# Patient Record
Sex: FEMALE | Race: WHITE | NOT HISPANIC OR LATINO | Employment: STUDENT | URBAN - METROPOLITAN AREA
[De-identification: names, ages, dates, MRNs, and addresses within clinical notes are randomized per-mention and may not be internally consistent; named-entity substitution may affect disease eponyms.]

---

## 2023-02-21 LAB
CALCIDIOL (25 OH VITAMIN D3) (NG/ML) IN SER/PLAS: 39 NG/ML
COBALAMIN (VITAMIN B12) (PG/ML) IN SER/PLAS: 182 PG/ML (ref 211–911)
ERYTHROCYTE DISTRIBUTION WIDTH (RATIO) BY AUTOMATED COUNT: 13.7 % (ref 11.5–14.5)
ERYTHROCYTE MEAN CORPUSCULAR HEMOGLOBIN CONCENTRATION (G/DL) BY AUTOMATED: 31.1 G/DL (ref 32–36)
ERYTHROCYTE MEAN CORPUSCULAR VOLUME (FL) BY AUTOMATED COUNT: 81 FL (ref 80–100)
ERYTHROCYTES (10*6/UL) IN BLOOD BY AUTOMATED COUNT: 5 X10E12/L (ref 4–5.2)
HEMATOCRIT (%) IN BLOOD BY AUTOMATED COUNT: 40.5 % (ref 36–46)
HEMOGLOBIN (G/DL) IN BLOOD: 12.6 G/DL (ref 12–16)
IRON (UG/DL) IN SER/PLAS: 143 UG/DL (ref 35–150)
IRON BINDING CAPACITY (UG/DL) IN SER/PLAS: 351 UG/DL (ref 240–445)
IRON SATURATION (%) IN SER/PLAS: 41 % (ref 25–45)
LEUKOCYTES (10*3/UL) IN BLOOD BY AUTOMATED COUNT: 6.1 X10E9/L (ref 4.4–11.3)
PLATELETS (10*3/UL) IN BLOOD AUTOMATED COUNT: 297 X10E9/L (ref 150–450)
THYROTROPIN (MIU/L) IN SER/PLAS BY DETECTION LIMIT <= 0.05 MIU/L: 1.61 MIU/L (ref 0.44–3.98)

## 2023-02-22 LAB
ESTIMATED AVERAGE GLUCOSE FOR HBA1C: 100 MG/DL
HEMOGLOBIN A1C/HEMOGLOBIN TOTAL IN BLOOD: 5.1 %

## 2023-03-19 PROBLEM — R63.5 WEIGHT GAIN: Status: ACTIVE | Noted: 2023-03-19

## 2023-03-19 PROBLEM — R53.83 FATIGUE: Status: ACTIVE | Noted: 2023-03-19

## 2023-03-19 PROBLEM — K59.00 CONSTIPATION: Status: ACTIVE | Noted: 2023-03-19

## 2023-03-19 PROBLEM — F33.1 MODERATE EPISODE OF RECURRENT MAJOR DEPRESSIVE DISORDER (MULTI): Status: ACTIVE | Noted: 2023-03-19

## 2023-03-19 RX ORDER — ESCITALOPRAM OXALATE 10 MG/1
1.5 TABLET ORAL DAILY
COMMUNITY
Start: 2021-06-29 | End: 2023-10-24 | Stop reason: SDUPTHER

## 2023-03-19 RX ORDER — BUPROPION HYDROCHLORIDE 150 MG/1
1 TABLET ORAL DAILY
COMMUNITY
Start: 2022-09-01 | End: 2023-10-24

## 2023-03-19 RX ORDER — LEVONORGESTREL 19.5 MG/1
INTRAUTERINE DEVICE INTRAUTERINE
COMMUNITY
Start: 2022-06-13

## 2023-03-27 ENCOUNTER — OFFICE VISIT (OUTPATIENT)
Dept: PRIMARY CARE | Facility: CLINIC | Age: 23
End: 2023-03-27
Payer: COMMERCIAL

## 2023-03-27 VITALS
BODY MASS INDEX: 29.89 KG/M2 | WEIGHT: 186 LBS | DIASTOLIC BLOOD PRESSURE: 72 MMHG | SYSTOLIC BLOOD PRESSURE: 116 MMHG | HEART RATE: 70 BPM | HEIGHT: 66 IN

## 2023-03-27 DIAGNOSIS — E53.8 VITAMIN B12 DEFICIENCY: Primary | ICD-10-CM

## 2023-03-27 PROCEDURE — 99213 OFFICE O/P EST LOW 20 MIN: CPT | Performed by: FAMILY MEDICINE

## 2023-03-27 ASSESSMENT — PATIENT HEALTH QUESTIONNAIRE - PHQ9
1. LITTLE INTEREST OR PLEASURE IN DOING THINGS: NOT AT ALL
2. FEELING DOWN, DEPRESSED OR HOPELESS: NOT AT ALL
SUM OF ALL RESPONSES TO PHQ9 QUESTIONS 1 AND 2: 0

## 2023-03-27 NOTE — PATIENT INSTRUCTIONS
Start Vitamin B12 1000 micrograms twice a day.  Recheck the labs in about 3 months, any  lab, no need to fast.

## 2023-03-27 NOTE — PROGRESS NOTES
"Subjective   Patient ID: Vera Guerra is a 23 y.o. female who presents for review labs.  Most of her labs from February looked really good including her vitamin D, thyroid, hemoglobin A1c, iron and CBC.  We then talked about her vitamin B12 level which is quite low.  She eats a regular diet, is not vegan or vegetarian.  She does not take vitamins.    She denies any other complaints.    Objective   /72   Pulse 70   Ht 1.676 m (5' 6\")   Wt 84.4 kg (186 lb)   BMI 30.02 kg/m²     Physical Exam  Alert adult white female, no acute distress  Affect pleasant and appropriate  Labs reviewed as above    Assessment/Plan   Problem List Items Addressed This Visit    None  Visit Diagnoses       Vitamin B12 deficiency    -  Primary    Relevant Orders    Vitamin B12    Folate    Methylmalonic acid, serum        She will start taking vitamin B12 over-the-counter 1000 mcg twice a day we will recheck labs in 3 months.  I did offer her the option of injectable B12 but she declined.       "

## 2023-10-24 ENCOUNTER — TELEMEDICINE (OUTPATIENT)
Dept: BEHAVIORAL HEALTH | Facility: CLINIC | Age: 23
End: 2023-10-24
Payer: COMMERCIAL

## 2023-10-24 DIAGNOSIS — F33.0 MILD EPISODE OF RECURRENT MAJOR DEPRESSIVE DISORDER (CMS-HCC): ICD-10-CM

## 2023-10-24 DIAGNOSIS — F41.1 GAD (GENERALIZED ANXIETY DISORDER): ICD-10-CM

## 2023-10-24 PROCEDURE — 99213 OFFICE O/P EST LOW 20 MIN: CPT | Performed by: NURSE PRACTITIONER

## 2023-10-24 RX ORDER — ESCITALOPRAM OXALATE 10 MG/1
10 TABLET ORAL DAILY
Qty: 90 TABLET | Refills: 3 | Status: SHIPPED | OUTPATIENT
Start: 2023-10-24 | End: 2024-02-23 | Stop reason: SDUPTHER

## 2023-10-24 NOTE — PROGRESS NOTES
"Vera is a 23 year old female with previous psychiatric history of depression. She lives in [place] by herself and attends [employer] with goal to become .      Vera is seen today via telehealth due to COVID for a follow up evaluation and management of symptoms. Last appointment Cymbalta 30mg daily started.  Going full time with job.     Depression/anxiety: Increased anxiety with work. Mood is \"ok.\" Feeling flat. Sleep is good. Appetite is less. Denies current SI/HI or self harm urges. States feels like medication has affected period.      ADHD: Focus and concentration are good.   OCD: Denies symptoms.   Debbie: Denies symptoms.   Psychosis: Denies symptoms.      Medication History: Birth control, Zoloft, Lexapro, Wellbutrin. Cymbalta.      Therapy: [facility]. Haven't gone in while.   Employment: Dispatcher.   Relationships: Two sisters and brother. Has boyfriend.   Family Psychiatric history: None.   Substance use: Denies symptoms.   Legal: Denies symptoms.      Developmental: Met developmental milestones.   All other systems reviewed and no concerns noted.      Mental Status Exam  General Appearance: Well groomed, appropriate eye contact  Attitude/Behavior: Cooperative  Motor: No psychomotor agitation or retardation, no tremor or other abnormal movements  Speech: Normal rate, volume, prosody  Gait/Station: WFL - Within functional limits  Mood: flat, anxious.  Affect: Euthymic, full-range  Thought Process: Linear, goal directed  Thought Associations: No loosening of associations  Thought Content: Normal  Perception: No perceptual abnormalities noted  Sensorium: Alert  Insight: Intact  Judgement: Intact  Cognition: Cognitively intact to conversational testing with respect to attention, orientation, fund of knowledge, recent and remote memory, and language     Review of Systems     Psychiatric: as noted in HPI.       Constitutional: no sleep apnea,~normal sleeping,~no night wakings,~no " snoring,~not a picky eater,~normal appetite~and~no swallowing problems.   ENT: no hearing tested~and~no hearing loss.   Cardiovascular: no murmur~and~no heart defect.   Respiratory: no wheezing.   Gastrointestinal: no constipation~and~no abdominal pain.   Genitourinary: no nocturnal enuresis~and~no diurnal enuresis.   Musculoskeletal: moving all extremities well and symmetrical.   Integumentary: no changes in moles or birthmarks~and~no rashes.   All other systems have been reviewed and are negative for complaint.     Impression:  Depression/Anxiety: Increased anxiety symptoms that appear distressful. States minimal efficacy from Cymbalta. Will dc. Will restart Lexapro.     Plan:  DC Cymbalta.   Switch to Lexapro 10mg daily to target anxiety symptoms.

## 2024-01-02 ENCOUNTER — APPOINTMENT (OUTPATIENT)
Dept: BEHAVIORAL HEALTH | Facility: CLINIC | Age: 24
End: 2024-01-02
Payer: COMMERCIAL

## 2024-02-23 ENCOUNTER — TELEMEDICINE (OUTPATIENT)
Dept: BEHAVIORAL HEALTH | Facility: CLINIC | Age: 24
End: 2024-02-23
Payer: COMMERCIAL

## 2024-02-23 DIAGNOSIS — F41.1 GAD (GENERALIZED ANXIETY DISORDER): ICD-10-CM

## 2024-02-23 DIAGNOSIS — F33.0 MILD EPISODE OF RECURRENT MAJOR DEPRESSIVE DISORDER (CMS-HCC): ICD-10-CM

## 2024-02-23 PROCEDURE — 99214 OFFICE O/P EST MOD 30 MIN: CPT | Performed by: NURSE PRACTITIONER

## 2024-02-23 RX ORDER — ESCITALOPRAM OXALATE 10 MG/1
15 TABLET ORAL DAILY
Qty: 135 TABLET | Refills: 3 | Status: SHIPPED | OUTPATIENT
Start: 2024-02-23 | End: 2025-02-22

## 2024-02-23 NOTE — PROGRESS NOTES
"Vera is a 23 year old female with previous psychiatric history of depression. She lives in [place] by herself and attends [employer] with goal to become .      Vera is seen today via telehealth due to COVID for a follow up evaluation and management of symptoms. Last appointment switched to Lexapro. Switched to day shift.      Depression/anxiety: Feeling blah for a while. Not feeling as happy as I should. Anxiety overall is better. Increased anxiety at night, worried will be late for school. Sleep is good. Appetite is improved. Motivation is less. Denies current SI/HI or self harm urges.      ADHD: Focus and concentration are good.   OCD: Denies symptoms.   Debbie: Denies symptoms.   Psychosis: Denies symptoms.      Medication History: Birth control, Zoloft, Lexapro, Wellbutrin. Cymbalta.      Therapy: [facility]. Haven't gone in while.   Employment: Dispatcher.   Relationships: Two sisters and brother. Has boyfriend.   Family Psychiatric history: None.   Substance use: Denies symptoms.   Legal: Denies symptoms.      Developmental: Met developmental milestones.   All other systems reviewed and no concerns noted.            Review of Systems     Psychiatric: as noted in HPI.       Constitutional: no sleep apnea,~normal sleeping,~no night wakings,~no snoring,~not a picky eater,~normal appetite~and~no swallowing problems.   ENT: no hearing tested~and~no hearing loss.   Cardiovascular: no murmur~and~no heart defect.   Respiratory: no wheezing.   Gastrointestinal: no constipation~and~no abdominal pain.   Genitourinary: no nocturnal enuresis~and~no diurnal enuresis.   Musculoskeletal: moving all extremities well and symmetrical.   Integumentary: no changes in moles or birthmarks~and~no rashes.   All other systems have been reviewed and are negative for complaint.     Impression:  Depression/Anxiety: Less anxiety symptoms. Feeling \"blah.\" Some efficacy from Lexapro.     Plan:  Increase " Lexapro to 15mg daily to target depression and anxiety symptoms.

## 2024-07-25 ENCOUNTER — TELEPHONE (OUTPATIENT)
Dept: BEHAVIORAL HEALTH | Facility: CLINIC | Age: 24
End: 2024-07-25
Payer: COMMERCIAL

## 2024-07-26 ENCOUNTER — TELEPHONE (OUTPATIENT)
Dept: BEHAVIORAL HEALTH | Facility: CLINIC | Age: 24
End: 2024-07-26
Payer: COMMERCIAL

## 2024-08-08 DIAGNOSIS — F41.1 GAD (GENERALIZED ANXIETY DISORDER): ICD-10-CM

## 2024-08-08 DIAGNOSIS — F33.0 MILD EPISODE OF RECURRENT MAJOR DEPRESSIVE DISORDER (CMS-HCC): ICD-10-CM

## 2024-08-08 RX ORDER — ESCITALOPRAM OXALATE 10 MG/1
15 TABLET ORAL DAILY
Qty: 135 TABLET | Refills: 3 | Status: SHIPPED | OUTPATIENT
Start: 2024-08-08 | End: 2024-08-09 | Stop reason: SDUPTHER

## 2024-08-09 DIAGNOSIS — F33.0 MILD EPISODE OF RECURRENT MAJOR DEPRESSIVE DISORDER (CMS-HCC): ICD-10-CM

## 2024-08-09 DIAGNOSIS — F41.1 GAD (GENERALIZED ANXIETY DISORDER): ICD-10-CM

## 2024-08-09 RX ORDER — ESCITALOPRAM OXALATE 10 MG/1
15 TABLET ORAL DAILY
Qty: 135 TABLET | Refills: 1 | Status: SHIPPED | OUTPATIENT
Start: 2024-08-09 | End: 2025-08-09

## 2024-10-18 ENCOUNTER — APPOINTMENT (OUTPATIENT)
Dept: PRIMARY CARE | Facility: CLINIC | Age: 24
End: 2024-10-18
Payer: COMMERCIAL

## 2024-10-18 VITALS
DIASTOLIC BLOOD PRESSURE: 74 MMHG | HEART RATE: 83 BPM | SYSTOLIC BLOOD PRESSURE: 121 MMHG | BODY MASS INDEX: 35.35 KG/M2 | OXYGEN SATURATION: 97 % | WEIGHT: 219 LBS

## 2024-10-18 DIAGNOSIS — E53.8 VITAMIN B12 DEFICIENCY: ICD-10-CM

## 2024-10-18 DIAGNOSIS — Z13.21 ENCOUNTER FOR VITAMIN DEFICIENCY SCREENING: ICD-10-CM

## 2024-10-18 DIAGNOSIS — Z13.1 SCREENING FOR DIABETES MELLITUS: ICD-10-CM

## 2024-10-18 DIAGNOSIS — Z00.00 WELL ADULT EXAM: Primary | ICD-10-CM

## 2024-10-18 DIAGNOSIS — Z13.220 SCREENING FOR HYPERLIPIDEMIA: ICD-10-CM

## 2024-10-18 DIAGNOSIS — E66.9 OBESITY (BMI 35.0-39.9 WITHOUT COMORBIDITY): ICD-10-CM

## 2024-10-18 PROCEDURE — 99213 OFFICE O/P EST LOW 20 MIN: CPT | Performed by: FAMILY MEDICINE

## 2024-10-18 PROCEDURE — 1036F TOBACCO NON-USER: CPT | Performed by: FAMILY MEDICINE

## 2024-10-18 PROCEDURE — 99395 PREV VISIT EST AGE 18-39: CPT | Performed by: FAMILY MEDICINE

## 2024-10-18 NOTE — PROGRESS NOTES
Subjective   Patient ID: Vera Guerra is a 24 y.o. female who presents for Weight Gain (Pt states she wants to discuss weight gain. No other concerns at the moment.).  She also wants an annual physical.    She abruptly stopped her lexapro 2 months ago due to thinking no RF was sent by her behavioral health team.  She feels okay off the Rx, and thinks she was gaining too much weight on the lexapro.      Past Medical, Surgical, Social and Family Hx reviewed-Yes    Any acute complaints?    Weight is up over 30 lbs in the past 2 years.    Exercise is 3 days a week with gym and 1-2 miles walking, but job is sedentary.  Some days only 2000 steps a day.  Sleep is okay, work is days.  She think her diet is fairly healthy.      Any chronic issues addressed today or Rx refills needed?    2021    Last pap 2021, will be seeing GYN, has IUD  Last Mammogram N/a  Colon CA screening Hx N/A  Labs discussed  Up to date on immunizations yes, except covid and flu  Dentist in the past year yes    Menstruation no periods with IUD  Sexual Activity no concerns        PE:  /74   Pulse 83   Wt 99.3 kg (219 lb)   SpO2 97%   BMI 35.35 kg/m²   Alert adult woman, NAD  HEENT clear  Neck supple, No LAD  Heart RRR no murmur  Lungs CTA bilat  Abdomen benign, normal BS, soft NT/ND  Skin warm, dry, clear  Neuro grossly normal, gait WNL  Affect pleasant and appropriate, memory and judgement WNL      Assessment/Plan   Assessment & Plan  Well adult exam  Reviewed HM and vaccines       Vitamin B12 deficiency    Orders:    Folate; Future    Methylmalonic Acid; Future    Screening for hyperlipidemia    Orders:    Lipid Panel; Future    Screening for diabetes mellitus    Orders:    Hemoglobin A1C; Future    Encounter for vitamin deficiency screening    Orders:    Vitamin B12; Future    Obesity (BMI 35.0-39.9 without comorbidity)    Orders:    tirzepatide, weight loss, (Zepbound) 2.5 mg/0.5 mL injection; Inject 2.5 mg under the skin every 7  days.      Reviewed wt loss Rx options.  Discussed possible side effects of the semaglutides, how dosing starts/progresses.  Pt knows that early studies show significant chance to gain back any wt lost, after stopping the meds.  If not covered and one attempt at prior auth ineffective, pt will be responsible for calling her ins to see what her covered options are, if any.  If the Rx is covered, call in 4 weeks to let me know if prefers to stay same dose or go up a step.  Then I will do 2 months Rx and follow up in person in 3 months.  Call sooner if any problems.

## 2024-10-18 NOTE — PATIENT INSTRUCTIONS
Reviewed wt loss Rx options.  Discussed possible side effects of the semaglutides, how dosing starts/progresses.  Pt knows that early studies show significant chance to gain back any wt lost, after stopping the meds.  If not covered and one attempt at prior auth ineffective, pt will be responsible for calling her ins to see what her covered options are, if any.  If the Rx is covered, call in 4 weeks to let me know if prefers to stay same dose or go up a step.  Then I will do 2 months Rx and follow up in person in 3 months.  Call sooner if any problems.

## 2024-10-24 ENCOUNTER — PATIENT MESSAGE (OUTPATIENT)
Dept: PRIMARY CARE | Facility: CLINIC | Age: 24
End: 2024-10-24

## 2024-10-24 ENCOUNTER — LAB (OUTPATIENT)
Dept: LAB | Facility: LAB | Age: 24
End: 2024-10-24
Payer: COMMERCIAL

## 2024-10-24 DIAGNOSIS — Z13.220 SCREENING FOR HYPERLIPIDEMIA: ICD-10-CM

## 2024-10-24 DIAGNOSIS — Z13.1 SCREENING FOR DIABETES MELLITUS: ICD-10-CM

## 2024-10-24 DIAGNOSIS — Z13.21 ENCOUNTER FOR VITAMIN DEFICIENCY SCREENING: ICD-10-CM

## 2024-10-24 DIAGNOSIS — E53.8 VITAMIN B12 DEFICIENCY: ICD-10-CM

## 2024-10-24 LAB
CHOLEST SERPL-MCNC: 158 MG/DL (ref 0–199)
CHOLESTEROL/HDL RATIO: 2.6
EST. AVERAGE GLUCOSE BLD GHB EST-MCNC: 108 MG/DL
FOLATE SERPL-MCNC: 13.4 NG/ML
HBA1C MFR BLD: 5.4 %
HDLC SERPL-MCNC: 61.6 MG/DL
LDLC SERPL CALC-MCNC: 86 MG/DL
NON HDL CHOLESTEROL: 96 MG/DL (ref 0–149)
TRIGL SERPL-MCNC: 52 MG/DL (ref 0–149)
VIT B12 SERPL-MCNC: 1059 PG/ML (ref 211–911)
VLDL: 10 MG/DL (ref 0–40)

## 2024-10-24 PROCEDURE — 36415 COLL VENOUS BLD VENIPUNCTURE: CPT

## 2024-10-24 PROCEDURE — 82746 ASSAY OF FOLIC ACID SERUM: CPT

## 2024-10-24 PROCEDURE — 83036 HEMOGLOBIN GLYCOSYLATED A1C: CPT

## 2024-10-24 PROCEDURE — 82607 VITAMIN B-12: CPT

## 2024-10-24 PROCEDURE — 80061 LIPID PANEL: CPT

## 2024-10-24 PROCEDURE — 83921 ORGANIC ACID SINGLE QUANT: CPT

## 2024-10-27 LAB — METHYLMALONATE SERPL-SCNC: 0.11 UMOL/L (ref 0–0.4)

## 2024-10-28 ENCOUNTER — TELEPHONE (OUTPATIENT)
Dept: PRIMARY CARE | Facility: CLINIC | Age: 24
End: 2024-10-28
Payer: COMMERCIAL

## 2024-11-11 ENCOUNTER — PATIENT MESSAGE (OUTPATIENT)
Dept: PRIMARY CARE | Facility: CLINIC | Age: 24
End: 2024-11-11
Payer: COMMERCIAL

## 2024-11-11 DIAGNOSIS — E66.9 OBESITY (BMI 35.0-39.9 WITHOUT COMORBIDITY): ICD-10-CM

## 2024-11-14 ENCOUNTER — TELEPHONE (OUTPATIENT)
Dept: PRIMARY CARE | Facility: CLINIC | Age: 24
End: 2024-11-14
Payer: COMMERCIAL

## 2024-11-14 NOTE — TELEPHONE ENCOUNTER
Pt called regarding med question. Pt states she wanted to up the dosage of the zepbound injection if possible. Last OV 10.18.24. [facility]. Next appt 1.20.2025 for weightloss.

## 2024-11-21 ENCOUNTER — APPOINTMENT (OUTPATIENT)
Dept: OBSTETRICS AND GYNECOLOGY | Facility: CLINIC | Age: 24
End: 2024-11-21
Payer: COMMERCIAL

## 2024-11-21 ENCOUNTER — PATIENT MESSAGE (OUTPATIENT)
Dept: PRIMARY CARE | Facility: CLINIC | Age: 24
End: 2024-11-21

## 2024-11-21 VITALS
HEIGHT: 66 IN | DIASTOLIC BLOOD PRESSURE: 74 MMHG | BODY MASS INDEX: 33.11 KG/M2 | WEIGHT: 206 LBS | SYSTOLIC BLOOD PRESSURE: 124 MMHG

## 2024-11-21 DIAGNOSIS — Z01.419 WELL WOMAN EXAM: Primary | ICD-10-CM

## 2024-11-21 DIAGNOSIS — E66.9 OBESITY (BMI 35.0-39.9 WITHOUT COMORBIDITY): ICD-10-CM

## 2024-11-21 DIAGNOSIS — Z12.4 CERVICAL CANCER SCREENING: ICD-10-CM

## 2024-11-21 PROCEDURE — 1036F TOBACCO NON-USER: CPT | Performed by: ADVANCED PRACTICE MIDWIFE

## 2024-11-21 PROCEDURE — 99385 PREV VISIT NEW AGE 18-39: CPT | Performed by: ADVANCED PRACTICE MIDWIFE

## 2024-11-21 PROCEDURE — 3008F BODY MASS INDEX DOCD: CPT | Performed by: ADVANCED PRACTICE MIDWIFE

## 2024-11-21 ASSESSMENT — PAIN SCALES - GENERAL: PAINLEVEL_OUTOF10: 0-NO PAIN

## 2024-11-21 NOTE — PROGRESS NOTES
"New pt visit to est care and IUD check     Last pap: 4.5.21 ( NEG ) TODAY ( declined GCCT )   Last mammo: NEVER    Chaperone declined: Cindy Shahid, CMA3    Assessment/Plan   Problem List Items Addressed This Visit             ICD-10-CM       Medium    Well woman exam - Primary Z01.419     Pap done  Declines STI screen  IUD in place, good until 2027   RTO 1 year or prn          Other Visit Diagnoses         Codes    Cervical cancer screening     Z12.4    Relevant Orders    THINPREP PAP TEST            Bhumi JOSE Palmer, APRN-CNM     Subjective   Vera Guerra is a 24 y.o. female who is here for a routine exam.     Received HPV vaccine    Lives with: self   Current employment: [employer], dispatcher   T/E/D: never/social ETOH/denies   Up to date vision/dental:  no/yes   PCP: Dr. Vera   Menstrual history: Amenorrhea on Kyleena   Sexual history: Monogamous male partner x 5 years   Occasional condom use   Pregnancy history:  G/P 0  T: P:  EAB:   Ectopic:   SAB:   L:      Diet: Balanced   Exercise:  Gym, walking, 2-3x/week     PMH, PSH, and Family hx reviewed and updated    Current contraception: IUD-Kyleena inserted 2022  Refill Y/N:    Last pap: 2021  History of abnormal Pap smear: no  Family history of breast, uterine,  ovarian cancer: no  Regular self breast exam: no  Last mammogram: n/a            Review of Systems    Objective   /74   Ht 1.676 m (5' 6\")   Wt 93.4 kg (206 lb)   LMP  (LMP Unknown)   BMI 33.25 kg/m²     Physical Exam  Constitutional:       Appearance: Normal appearance.   HENT:      Head: Normocephalic.   Neck:      Thyroid: No thyroid mass, thyromegaly or thyroid tenderness.   Cardiovascular:      Rate and Rhythm: Normal rate and regular rhythm.   Pulmonary:      Effort: Pulmonary effort is normal.      Breath sounds: Normal breath sounds.   Chest:   Breasts:     Right: Normal. No mass, nipple discharge or tenderness.      Left: Normal. No mass, nipple discharge " or tenderness.   Abdominal:      Palpations: Abdomen is soft.   Genitourinary:     General: Normal vulva.      Vagina: Normal.      Cervix: Normal.      Comments: IUD strings present   Musculoskeletal:         General: Normal range of motion.   Lymphadenopathy:      Upper Body:      Right upper body: No axillary adenopathy.      Left upper body: No axillary adenopathy.   Skin:     General: Skin is warm and dry.   Neurological:      Mental Status: She is alert and oriented to person, place, and time.   Psychiatric:         Mood and Affect: Mood normal.

## 2024-11-26 LAB
CYTOLOGY CMNT CVX/VAG CYTO-IMP: NORMAL
LAB AP HPV HR: NORMAL
LABORATORY COMMENT REPORT: NORMAL
PATH REPORT.TOTAL CANCER: NORMAL

## 2024-12-18 ENCOUNTER — PATIENT MESSAGE (OUTPATIENT)
Dept: PRIMARY CARE | Facility: CLINIC | Age: 24
End: 2024-12-18
Payer: COMMERCIAL

## 2025-01-02 ENCOUNTER — OFFICE VISIT (OUTPATIENT)
Dept: URGENT CARE | Age: 25
End: 2025-01-02
Payer: COMMERCIAL

## 2025-01-02 VITALS
SYSTOLIC BLOOD PRESSURE: 121 MMHG | OXYGEN SATURATION: 99 % | TEMPERATURE: 98.1 F | DIASTOLIC BLOOD PRESSURE: 83 MMHG | HEART RATE: 70 BPM | RESPIRATION RATE: 12 BRPM

## 2025-01-02 DIAGNOSIS — J02.9 SORE THROAT: ICD-10-CM

## 2025-01-02 DIAGNOSIS — H92.03 OTALGIA OF BOTH EARS: Primary | ICD-10-CM

## 2025-01-02 LAB — POC RAPID STREP: NEGATIVE

## 2025-01-02 PROCEDURE — 87880 STREP A ASSAY W/OPTIC: CPT | Performed by: STUDENT IN AN ORGANIZED HEALTH CARE EDUCATION/TRAINING PROGRAM

## 2025-01-02 PROCEDURE — 99213 OFFICE O/P EST LOW 20 MIN: CPT | Performed by: STUDENT IN AN ORGANIZED HEALTH CARE EDUCATION/TRAINING PROGRAM

## 2025-01-02 PROCEDURE — 1036F TOBACCO NON-USER: CPT | Performed by: STUDENT IN AN ORGANIZED HEALTH CARE EDUCATION/TRAINING PROGRAM

## 2025-01-02 NOTE — PROGRESS NOTES
Subjective   Patient ID: Vera Guerra is a 24 y.o. female. They present today with a chief complaint of sore throat with ear pain x 3 days.    History of Present Illness  Vera is a 24-year-old female who presents to the urgent care for evaluation of 3 days of sore throat and bilateral ear fullness with pain without significant cough, recorded fever, chest pain or shortness of breath.  Patient has concerns for possible ear infection and would like this evaluated and ruled out.  Patient denies other associated symptoms otherwise.  Patient is seeking evaluation reassurance.  No other symptoms or concerns otherwise reported.    Past Medical History  Allergies as of 01/02/2025    (No Known Allergies)       (Not in a hospital admission)       Past Medical History:   Diagnosis Date    Acute vaginitis 02/15/2018    Bacterial vaginosis    Personal history of other drug therapy     History of vaccination against human papillomavirus       No past surgical history on file.     reports that she has never smoked. She has never used smokeless tobacco. She reports current alcohol use. She reports that she does not currently use drugs.    Review of Systems  A 10-point review of systems was performed, otherwise unremarkable unless stated in the history of present illness.                Objective    Vitals:    01/02/25 1204   BP: 121/83   Pulse: 70   Resp: 12   Temp: 36.7 °C (98.1 °F)   SpO2: 99%     No LMP recorded. (Menstrual status: IUD).    Gen: Vitals noted and reviewed, no evidence of acute distress, well developed and afebrile.   Psych: Mood and affect appropriate for setting.  Head/Face: Atraumatic and normocephalic.   Neuro: No focal deficits noted.  ENT: TMs clear bilaterally, EACs unremarkable. Mastoids non-tender. Posterior oropharynx without erythema, exudate, or swelling. Uvula is in the midline and non-edematous.   Neck: Supple. No meningismus through full range of motion. No lymphadenopathy.   Cardiac: Regular  rate and rhythm no murmur.   Lungs: Clear to auscultation throughout, No evidence of wheezing, rhonchi or crackles. Good aeration throughout.   Extremities: Symmetrical, No peripheral edema  Skin: Without evidence of ecchymosis, wounds, or rashes.      Point of Care Test & Imaging Results from this visit  Results for orders placed or performed in visit on 01/02/25   POCT rapid strep A manually resulted   Result Value Ref Range    POC Rapid Strep Negative Negative      No results found.    Diagnostic study results (if any) were reviewed by Deshawn Tello DO.    Assessment/Plan   Allergies, medications, history, and pertinent labs/EKGs/Imaging reviewed by Deshawn Tello DO.     Medical Decision Making  Discussed with the patient symptoms and clinical presentation findings suggestive of an acute pharyngitis with likely sinusitis in the setting of bilateral eustachian tube dysfunction likely secondary viral illness of unclear etiology.  We advise close symptom monitoring supportive treatment and use of over-the-counter remedies for added symptom relief. Follow up with PCP. We advised seeking immediate emergency medical attention if symptoms fail to improve, worsen or any concerning symptoms arise. Patient voiced full understanding and agreement to plan.      Orders and Diagnoses  Diagnoses and all orders for this visit:  Otalgia of both ears  Sore throat  -     POCT rapid strep A manually resulted      Medical Admin Record      Patient disposition: Home    Electronically signed by Deshawn Tello DO  12:26 PM

## 2025-01-13 ENCOUNTER — TELEPHONE (OUTPATIENT)
Dept: PRIMARY CARE | Facility: CLINIC | Age: 25
End: 2025-01-13
Payer: COMMERCIAL

## 2025-01-13 NOTE — TELEPHONE ENCOUNTER
Patient stated that she is taking zepbound medication and she's been taking it for the last 3 months but after she took her last dose last week on wednesday she's been vomiting and having really bad stomach pains she wants to know should she stop the medication or if you could prescribe her something for her last dosage

## 2025-01-13 NOTE — TELEPHONE ENCOUNTER
I do not think new vomiting and stomach pain is related to a medication that has not changed in over a month.  Could she possibly just be ill, as there are quite a few GI illnesses going around right now.  She can take the last dose or skip it, her choice, but she should not need additional meds in order to take it if she chooses.    Carly Vera MD

## 2025-01-20 ENCOUNTER — APPOINTMENT (OUTPATIENT)
Dept: PRIMARY CARE | Facility: CLINIC | Age: 25
End: 2025-01-20
Payer: COMMERCIAL

## 2025-01-20 VITALS
DIASTOLIC BLOOD PRESSURE: 68 MMHG | WEIGHT: 179.8 LBS | OXYGEN SATURATION: 100 % | BODY MASS INDEX: 29.02 KG/M2 | HEART RATE: 97 BPM | SYSTOLIC BLOOD PRESSURE: 116 MMHG

## 2025-01-20 DIAGNOSIS — E66.9 OBESITY (BMI 35.0-39.9 WITHOUT COMORBIDITY): ICD-10-CM

## 2025-01-20 PROCEDURE — G2211 COMPLEX E/M VISIT ADD ON: HCPCS | Performed by: FAMILY MEDICINE

## 2025-01-20 PROCEDURE — 99213 OFFICE O/P EST LOW 20 MIN: CPT | Performed by: FAMILY MEDICINE

## 2025-01-20 PROCEDURE — 1036F TOBACCO NON-USER: CPT | Performed by: FAMILY MEDICINE

## 2025-01-20 ASSESSMENT — PATIENT HEALTH QUESTIONNAIRE - PHQ9
SUM OF ALL RESPONSES TO PHQ9 QUESTIONS 1 AND 2: 0
2. FEELING DOWN, DEPRESSED OR HOPELESS: NOT AT ALL
1. LITTLE INTEREST OR PLEASURE IN DOING THINGS: NOT AT ALL

## 2025-01-20 NOTE — PROGRESS NOTES
Subjective   Patient ID: Vera Guerra is a 25 y.o. female who presents for Follow-up (Weight loss follow up, no other concerns at this time.).  She is doing very well on the zepbound.  After only 3 months she is down 40 lbs.  She gets nausea after every shot, and even vomits.  After the first month on the lowest 2.5 mg dose she was down 19 lbs.  The nausea did get worse when the dose went up      Histories reviewed      Objective   /68   Pulse 97   Wt 81.6 kg (179 lb 12.8 oz)   SpO2 100%   BMI 29.02 kg/m²    Physical Exam    Alert adult, NAD  Affect pleasant  Heart RRR no murmur  Lungs CTA bilat      Assessment & Plan  Obesity (BMI 35.0-39.9 without comorbidity)  She is having great wt loss on the Rx   Due to the nausea I think she should decrease the dose back ro 2.5, and pt agrees  Follow up 3 months  Orders:    tirzepatide, weight loss, (Zepbound) 2.5 mg/0.5 mL injection; Inject 2.5 mg under the skin every 7 days.

## 2025-01-24 ENCOUNTER — TELEPHONE (OUTPATIENT)
Dept: PRIMARY CARE | Facility: CLINIC | Age: 25
End: 2025-01-24
Payer: COMMERCIAL

## 2025-01-24 NOTE — TELEPHONE ENCOUNTER
Patient left  asking for return call regarding her weight loss medication. Mo other info left on . She can be reached at [phone]

## 2025-09-16 ENCOUNTER — APPOINTMENT (OUTPATIENT)
Dept: PRIMARY CARE | Facility: CLINIC | Age: 25
End: 2025-09-16
Payer: COMMERCIAL

## 2025-09-19 ENCOUNTER — APPOINTMENT (OUTPATIENT)
Dept: PRIMARY CARE | Facility: CLINIC | Age: 25
End: 2025-09-19
Payer: COMMERCIAL